# Patient Record
Sex: FEMALE | Race: OTHER | NOT HISPANIC OR LATINO | URBAN - METROPOLITAN AREA
[De-identification: names, ages, dates, MRNs, and addresses within clinical notes are randomized per-mention and may not be internally consistent; named-entity substitution may affect disease eponyms.]

---

## 2022-10-19 NOTE — ASU PATIENT PROFILE, ADULT - PRO MENTAL HEALTH SX RECENT
Patient awake and resting in bed. Denies c/o pain, numbness or tingling at this time. Patient requesting food. Meal tray ordered per MD.  Patient expresses no further needs at this time. Call bell within reach. Awaiting MRI results. none

## 2022-10-19 NOTE — ASU PATIENT PROFILE, ADULT - FALL HARM RISK - UNIVERSAL INTERVENTIONS
Bed in lowest position, wheels locked, appropriate side rails in place/Call bell, personal items and telephone in reach/Instruct patient to call for assistance before getting out of bed or chair/Non-slip footwear when patient is out of bed/[place] to call system/Physically safe environment - no spills, clutter or unnecessary equipment/Purposeful Proactive Rounding/Room/bathroom lighting operational, light cord in reach

## 2022-10-20 ENCOUNTER — INPATIENT (INPATIENT)
Facility: HOSPITAL | Age: 37
LOS: 0 days | Discharge: HOME | DRG: 743 | End: 2022-10-21
Attending: OBSTETRICS & GYNECOLOGY | Admitting: OBSTETRICS & GYNECOLOGY
Payer: COMMERCIAL

## 2022-10-20 VITALS
HEIGHT: 63 IN | WEIGHT: 133.6 LBS | DIASTOLIC BLOOD PRESSURE: 79 MMHG | RESPIRATION RATE: 16 BRPM | SYSTOLIC BLOOD PRESSURE: 136 MMHG | TEMPERATURE: 98 F | HEART RATE: 78 BPM | OXYGEN SATURATION: 100 %

## 2022-10-20 DIAGNOSIS — Z98.890 OTHER SPECIFIED POSTPROCEDURAL STATES: Chronic | ICD-10-CM

## 2022-10-20 LAB
BLD GP AB SCN SERPL QL: NEGATIVE — SIGNIFICANT CHANGE UP
RH IG SCN BLD-IMP: POSITIVE — SIGNIFICANT CHANGE UP

## 2022-10-20 PROCEDURE — 88305 TISSUE EXAM BY PATHOLOGIST: CPT | Mod: 26

## 2022-10-20 RX ORDER — GABAPENTIN 400 MG/1
300 CAPSULE ORAL ONCE
Refills: 0 | Status: COMPLETED | OUTPATIENT
Start: 2022-10-20 | End: 2022-10-20

## 2022-10-20 RX ORDER — KETOROLAC TROMETHAMINE 30 MG/ML
30 SYRINGE (ML) INJECTION EVERY 8 HOURS
Refills: 0 | Status: DISCONTINUED | OUTPATIENT
Start: 2022-10-20 | End: 2022-10-21

## 2022-10-20 RX ORDER — HYDROMORPHONE HYDROCHLORIDE 2 MG/ML
0.5 INJECTION INTRAMUSCULAR; INTRAVENOUS; SUBCUTANEOUS
Refills: 0 | Status: DISCONTINUED | OUTPATIENT
Start: 2022-10-20 | End: 2022-10-21

## 2022-10-20 RX ORDER — ACETAMINOPHEN 500 MG
1000 TABLET ORAL ONCE
Refills: 0 | Status: COMPLETED | OUTPATIENT
Start: 2022-10-20 | End: 2022-10-20

## 2022-10-20 RX ORDER — ONDANSETRON 8 MG/1
8 TABLET, FILM COATED ORAL EVERY 8 HOURS
Refills: 0 | Status: DISCONTINUED | OUTPATIENT
Start: 2022-10-20 | End: 2022-10-21

## 2022-10-20 RX ORDER — SENNA PLUS 8.6 MG/1
2 TABLET ORAL AT BEDTIME
Refills: 0 | Status: DISCONTINUED | OUTPATIENT
Start: 2022-10-20 | End: 2022-10-21

## 2022-10-20 RX ORDER — SODIUM CHLORIDE 9 MG/ML
1000 INJECTION, SOLUTION INTRAVENOUS
Refills: 0 | Status: DISCONTINUED | OUTPATIENT
Start: 2022-10-20 | End: 2022-10-21

## 2022-10-20 RX ORDER — SIMETHICONE 80 MG/1
80 TABLET, CHEWABLE ORAL EVERY 6 HOURS
Refills: 0 | Status: DISCONTINUED | OUTPATIENT
Start: 2022-10-20 | End: 2022-10-21

## 2022-10-20 RX ORDER — CABERGOLINE 0.5 MG/1
0 TABLET ORAL
Qty: 0 | Refills: 0 | DISCHARGE

## 2022-10-20 RX ORDER — MULTIVIT-MIN/FERROUS GLUCONATE 9 MG/15 ML
1 LIQUID (ML) ORAL
Qty: 0 | Refills: 0 | DISCHARGE

## 2022-10-20 RX ORDER — HYDROMORPHONE HYDROCHLORIDE 2 MG/ML
0.5 INJECTION INTRAMUSCULAR; INTRAVENOUS; SUBCUTANEOUS EVERY 6 HOURS
Refills: 0 | Status: DISCONTINUED | OUTPATIENT
Start: 2022-10-20 | End: 2022-10-21

## 2022-10-20 RX ORDER — LEVOTHYROXINE SODIUM 125 MCG
1 TABLET ORAL
Qty: 0 | Refills: 0 | DISCHARGE

## 2022-10-20 RX ORDER — ACETAMINOPHEN 500 MG
1000 TABLET ORAL EVERY 6 HOURS
Refills: 0 | Status: DISCONTINUED | OUTPATIENT
Start: 2022-10-20 | End: 2022-10-21

## 2022-10-20 RX ORDER — LEVOTHYROXINE SODIUM 125 MCG
88 TABLET ORAL DAILY
Refills: 0 | Status: DISCONTINUED | OUTPATIENT
Start: 2022-10-21 | End: 2022-10-21

## 2022-10-20 RX ADMIN — HYDROMORPHONE HYDROCHLORIDE 0.5 MILLIGRAM(S): 2 INJECTION INTRAMUSCULAR; INTRAVENOUS; SUBCUTANEOUS at 20:31

## 2022-10-20 RX ADMIN — GABAPENTIN 300 MILLIGRAM(S): 400 CAPSULE ORAL at 13:10

## 2022-10-20 RX ADMIN — SODIUM CHLORIDE 125 MILLILITER(S): 9 INJECTION, SOLUTION INTRAVENOUS at 20:15

## 2022-10-20 RX ADMIN — Medication 1000 MILLIGRAM(S): at 13:10

## 2022-10-20 RX ADMIN — HYDROMORPHONE HYDROCHLORIDE 0.5 MILLIGRAM(S): 2 INJECTION INTRAMUSCULAR; INTRAVENOUS; SUBCUTANEOUS at 20:13

## 2022-10-20 NOTE — DISCHARGE NOTE PROVIDER - HOSPITAL COURSE
36 yo  LMP 10/15 s/p l/s endo excision, myomectomy (removal of 2 fibroids), b/l ureterolysis, hysteroscopy, D&C, b/l ovarian suspensions; she was admitted for post-operative monitoring and pain control. Bilateral ovarian suspensions were removed on POD1. She met all post-operative milestones and was discharged clinically and hemodynamically stable.

## 2022-10-20 NOTE — H&P ADULT - ASSESSMENT
PAST SURGICAL HISTORY:  No significant past surgical history     No significant past surgical history      38 yo  LMP 10/15 presenting for scheduled laparoscopic excision of endometriosis.    Neuro: tylenol/toradol, dilaudid 0.5 q6 PRN  CV: VSS  Pulm: RA  FEN/GI: , regular, senna, simethicone, zofran, -/-  GYN: b/l ovarian suspensions  : Vasquez in place  Heme: AM CBC  DVT ppx: SCDs

## 2022-10-20 NOTE — DISCHARGE NOTE PROVIDER - NSDCMRMEDTOKEN_GEN_ALL_CORE_FT
cabergoline 0.5 mg oral tablet: orally once a week  Synthroid 88 mcg (0.088 mg) oral tablet: 1 tab(s) orally once a day  Vitafol oral tablet: 1 tab(s) orally once a day

## 2022-10-20 NOTE — H&P ADULT - HISTORY OF PRESENT ILLNESS
38 yo G LMP presenting for scheduled laparoscopic excision of endometriosis.    OBHx:  GynHx:    PMH: hypothyroidism  PSH: hysteroscopy D&C  Meds: synthroid 88 mcg qd, cabergoline 0.5 mg once per week  Allergies: iodine- SOB 38 yo  LMP 10/15 presenting for scheduled laparoscopic excision of endometriosis.    Patient is undergoing fertility treatments and had markers concerning for endometriosis but has not yet been officially diagnosed.    OBHx: G1- SAB 2017, G2- MAB D&C   GynHx: small fibroid, HPV+ pap 2016 - self resolved, hx simple ovarian cysts    PMH: hypothyroidism, pituitary adenoma  PSH: hysteroscopy D&C   Meds: synthroid 88 mcg qd, cabergoline 0.5 mg once per week, multivitamin  Allergies: iodine- SOB

## 2022-10-20 NOTE — PRE-ANESTHESIA EVALUATION ADULT - NSPROPOSEDPROCEDFT_GEN_ALL_CORE
Laprascopic excision of endometriosis, hysteroscopy, D&C, possible adenomyomectomy, possible appendectomy

## 2022-10-20 NOTE — DISCHARGE NOTE PROVIDER - CARE PROVIDER_API CALL
Niharika Lutz)  Obstetrics and Gynecology  [address] [place] [id number]  Phone: [phone]  Fax: [phone]  Follow Up Time:

## 2022-10-20 NOTE — H&P ADULT - NSHPPHYSICALEXAM_GEN_ALL_CORE
Vital Signs Last 24 Hrs  T(C): 36 (20 Oct 2022 16:18), Max: 36.4 (20 Oct 2022 11:15)  T(F): 97.5 (20 Oct 2022 11:15), Max: 97.5 (20 Oct 2022 11:15)  HR: 78 (20 Oct 2022 16:18) (78 - 78)  BP: 136/79 (20 Oct 2022 16:18) (136/79 - 136/79)  BP(mean): --  RR: 16 (20 Oct 2022 16:18) (16 - 16)  SpO2: 100% (20 Oct 2022 16:18) (100% - 100%)    Gen: well appearing  Pulm: normal respiratory rate  Abd: soft, nontender, nondistended  Ext; no edema or erythema
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
## 2022-10-20 NOTE — DISCHARGE NOTE PROVIDER - NSDCCPTREATMENT_GEN_ALL_CORE_FT
PRINCIPAL PROCEDURE  Procedure: Laparoscopic myomectomy  Findings and Treatment:       SECONDARY PROCEDURE  Procedure: Hysteroscopy with dilation and curettage of uterus  Findings and Treatment:     Procedure: Excision, endometriosis, laparoscopic  Findings and Treatment:

## 2022-10-20 NOTE — BRIEF OPERATIVE NOTE - NSICDXBRIEFPROCEDURE_GEN_ALL_CORE_FT
PROCEDURES:  Excision, endometriosis, laparoscopic 20-Oct-2022 19:45:13  Renetta Dodd  Laparoscopic myomectomy 20-Oct-2022 19:45:33  Renetta Dodd  Hysteroscopy with dilation and curettage of uterus 20-Oct-2022 19:45:48  Renetta Dodd

## 2022-10-21 VITALS
RESPIRATION RATE: 16 BRPM | HEART RATE: 73 BPM | SYSTOLIC BLOOD PRESSURE: 114 MMHG | OXYGEN SATURATION: 100 % | DIASTOLIC BLOOD PRESSURE: 66 MMHG | TEMPERATURE: 99 F

## 2022-10-21 LAB
BASOPHILS # BLD AUTO: 0.03 K/UL — SIGNIFICANT CHANGE UP (ref 0–0.2)
BASOPHILS NFR BLD AUTO: 0.2 % — SIGNIFICANT CHANGE UP (ref 0–2)
EOSINOPHIL # BLD AUTO: 0 K/UL — SIGNIFICANT CHANGE UP (ref 0–0.5)
EOSINOPHIL NFR BLD AUTO: 0 % — SIGNIFICANT CHANGE UP (ref 0–6)
HCT VFR BLD CALC: 36.3 % — SIGNIFICANT CHANGE UP (ref 34.5–45)
HGB BLD-MCNC: 11.6 G/DL — SIGNIFICANT CHANGE UP (ref 11.5–15.5)
IMM GRANULOCYTES NFR BLD AUTO: 0.3 % — SIGNIFICANT CHANGE UP (ref 0–0.9)
LYMPHOCYTES # BLD AUTO: 1.47 K/UL — SIGNIFICANT CHANGE UP (ref 1–3.3)
LYMPHOCYTES # BLD AUTO: 12 % — LOW (ref 13–44)
MCHC RBC-ENTMCNC: 26.4 PG — LOW (ref 27–34)
MCHC RBC-ENTMCNC: 32 GM/DL — SIGNIFICANT CHANGE UP (ref 32–36)
MCV RBC AUTO: 82.7 FL — SIGNIFICANT CHANGE UP (ref 80–100)
MONOCYTES # BLD AUTO: 0.72 K/UL — SIGNIFICANT CHANGE UP (ref 0–0.9)
MONOCYTES NFR BLD AUTO: 5.9 % — SIGNIFICANT CHANGE UP (ref 2–14)
NEUTROPHILS # BLD AUTO: 9.98 K/UL — HIGH (ref 1.8–7.4)
NEUTROPHILS NFR BLD AUTO: 81.6 % — HIGH (ref 43–77)
NRBC # BLD: 0 /100 WBCS — SIGNIFICANT CHANGE UP (ref 0–0)
PLATELET # BLD AUTO: 284 K/UL — SIGNIFICANT CHANGE UP (ref 150–400)
RBC # BLD: 4.39 M/UL — SIGNIFICANT CHANGE UP (ref 3.8–5.2)
RBC # FLD: 15.5 % — HIGH (ref 10.3–14.5)
WBC # BLD: 12.24 K/UL — HIGH (ref 3.8–10.5)
WBC # FLD AUTO: 12.24 K/UL — HIGH (ref 3.8–10.5)

## 2022-10-21 PROCEDURE — 86901 BLOOD TYPING SEROLOGIC RH(D): CPT

## 2022-10-21 PROCEDURE — 85025 COMPLETE CBC W/AUTO DIFF WBC: CPT

## 2022-10-21 PROCEDURE — 88305 TISSUE EXAM BY PATHOLOGIST: CPT

## 2022-10-21 PROCEDURE — 36415 COLL VENOUS BLD VENIPUNCTURE: CPT

## 2022-10-21 PROCEDURE — 86900 BLOOD TYPING SEROLOGIC ABO: CPT

## 2022-10-21 PROCEDURE — 86850 RBC ANTIBODY SCREEN: CPT

## 2022-10-21 RX ORDER — KETOROLAC TROMETHAMINE 30 MG/ML
30 SYRINGE (ML) INJECTION EVERY 6 HOURS
Refills: 0 | Status: DISCONTINUED | OUTPATIENT
Start: 2022-10-21 | End: 2022-10-21

## 2022-10-21 RX ORDER — INFLUENZA VIRUS VACCINE 15; 15; 15; 15 UG/.5ML; UG/.5ML; UG/.5ML; UG/.5ML
0.5 SUSPENSION INTRAMUSCULAR ONCE
Refills: 0 | Status: DISCONTINUED | OUTPATIENT
Start: 2022-10-21 | End: 2022-10-21

## 2022-10-21 RX ADMIN — SENNA PLUS 2 TABLET(S): 8.6 TABLET ORAL at 00:41

## 2022-10-21 RX ADMIN — Medication 30 MILLIGRAM(S): at 01:00

## 2022-10-21 RX ADMIN — Medication 30 MILLIGRAM(S): at 09:19

## 2022-10-21 RX ADMIN — Medication 1000 MILLIGRAM(S): at 06:45

## 2022-10-21 RX ADMIN — Medication 1000 MILLIGRAM(S): at 00:41

## 2022-10-21 RX ADMIN — Medication 30 MILLIGRAM(S): at 00:42

## 2022-10-21 RX ADMIN — Medication 1000 MILLIGRAM(S): at 05:42

## 2022-10-21 RX ADMIN — Medication 1000 MILLIGRAM(S): at 11:33

## 2022-10-21 RX ADMIN — SIMETHICONE 80 MILLIGRAM(S): 80 TABLET, CHEWABLE ORAL at 05:46

## 2022-10-21 RX ADMIN — Medication 1000 MILLIGRAM(S): at 00:55

## 2022-10-21 RX ADMIN — Medication 30 MILLIGRAM(S): at 09:35

## 2022-10-21 RX ADMIN — SIMETHICONE 80 MILLIGRAM(S): 80 TABLET, CHEWABLE ORAL at 11:32

## 2022-10-21 NOTE — DISCHARGE NOTE NURSING/CASE MANAGEMENT/SOCIAL WORK - NSDCPEFALRISK_GEN_ALL_CORE
For information on Fall & Injury Prevention, visit: [email or web address] OR  [email or web address] OR  https://www.cdc.gov/steadi/patient.html

## 2022-10-21 NOTE — PROGRESS NOTE ADULT - SUBJECTIVE AND OBJECTIVE BOX
Patient evaluated at bedside. She has no complaints. She reports pain is well controlled with medications. She denies HA, dizziness, SOB, CP, palpitations, n/v.  She is ambulating tolerating regular diet. Vasquez in place. No flatus yet.    T(C): 36.2 (10-21-22 @ 05:18), Max: 37.1 (10-20-22 @ 19:41)  HR: 65 (10-21-22 @ 05:18) (54 - 78)  BP: 118/75 (10-21-22 @ 05:18) (107/72 - 124/65)  RR: 17 (10-21-22 @ 05:18) (10 - 17)  SpO2: 100% (10-21-22 @ 05:18) (98% - 100%)    GA:  Resp: normal respiratory effort  Abd: +BS, soft, NT/ND, no rebound or guarding, b/l suspensions intact  Extrem: SCDs in place, no LE edema       10-20 @ 07:01  -  10-21 @ 07:00  --------------------------------------------------------  IN: 1500 mL / OUT: 420 mL / NET: 1080 mL                 Patient evaluated at bedside. She has no complaints. She reports pain is well controlled with medications.  She endorses nausea, but no vomiting.  She denies HA, dizziness, SOB, CP, palpitations.  She is ambulating tolerating regular diet. Vasquez in place. No flatus yet.    T(C): 36.2 (10-21-22 @ 05:18), Max: 37.1 (10-20-22 @ 19:41)  HR: 65 (10-21-22 @ 05:18) (54 - 78)  BP: 118/75 (10-21-22 @ 05:18) (107/72 - 124/65)  RR: 17 (10-21-22 @ 05:18) (10 - 17)  SpO2: 100% (10-21-22 @ 05:18) (98% - 100%)    GA:  Resp: normal respiratory effort  Abd: +BS, soft, NT/ND, no rebound or guarding, b/l suspensions intact  Extrem: SCDs in place, no LE edema       10-20 @ 07:01  -  10-21 @ 07:00  --------------------------------------------------------  IN: 1500 mL / OUT: 420 mL / NET: 1080 mL

## 2022-10-21 NOTE — PROGRESS NOTE ADULT - ASSESSMENT
36 yo  LMP 10/15 s/p l/s endo excision, myomectomy (removal of 2 fibroids), b/l ureterolysis, hysteroscopy, D&C, b/l ovarian suspensions    Neuro: tylenol/toradol, dilaudid 0.5 q6 PRN  CV: VSS  Pulm: RA  FEN/GI: , regular, senna, simethicone, zofran, -/-  GYN: s/p b/l ovarian suspensions  : s/p Christina, TOV 15:00  Heme: AM CBC pending  Endo: synthroid 88 mcg qd  DVT ppx: SCDs     36 yo  LMP 10/15 s/p l/s endo excision, myomectomy (removal of 2 fibroids), b/l ureterolysis, hysteroscopy, D&C, b/l ovarian suspensions, admitted for postoperative nausea.    Neuro: tylenol/toradol, dilaudid 0.5 q6 PRN  CV: VSS  Pulm: RA  FEN/GI: , regular, senna, simethicone, zofran, -/-  GYN: s/p b/l ovarian suspensions  : s/p Christina, TOV 15:00  Heme: AM CBC pending  Endo: synthroid 88 mcg qd  DVT ppx: SCDs

## 2022-10-21 NOTE — PATIENT PROFILE ADULT - FALL HARM RISK - HARM RISK INTERVENTIONS

## 2022-10-21 NOTE — CHART NOTE - NSCHARTNOTEFT_GEN_A_CORE
Note entry delayed 2/2 pt care.     GYN POC    Pt seen at bedside for POC. Pt found to be sleeping.    T(F): 97.4 (10-21-22 @ 01:09), Max: 98.8 (10-20-22 @ 19:41)  HR: 64 (10-21-22 @ 01:09) (54 - 78)  BP: 107/72 (10-21-22 @ 01:09) (107/72 - 136/79)  RR: 17 (10-21-22 @ 01:09) (10 - 17)  SpO2: 98% (10-21-22 @ 01:09) (98% - 100%)  Wt(kg): --    10-20 @ 07:01  -  10-21 @ 03:53  --------------------------------------------------------  IN: 1250 mL / OUT: 420 mL / NET: 830 mL        acetaminophen     Tablet .. 1000 milliGRAM(s) Oral every 6 hours  HYDROmorphone  Injectable 0.5 milliGRAM(s) IV Push every 15 minutes PRN Severe Pain (7 - 10)  HYDROmorphone  Injectable 0.5 milliGRAM(s) IV Push every 6 hours PRN Severe Pain (7 - 10)  ketorolac   Injectable 30 milliGRAM(s) IV Push every 6 hours  lactated ringers. 1000 milliLiter(s) IV Continuous <Continuous>  levothyroxine 88 MICROGram(s) Oral daily  ondansetron Injectable 8 milliGRAM(s) IV Push every 8 hours PRN Nausea and/or Vomiting  senna 2 Tablet(s) Oral at bedtime  simethicone 80 milliGRAM(s) Chew every 6 hours PRN Gas      Physical exam:  Constitutional: NAD, sleeping  Pulmonary: no inc WOB  : austin draining yellow urine    A: 36 yo  LMP 10/15 s/p l/s endo excision, myomectomy (removal of 2 fibroids), b/l ureterolysis, hysteroscopy, D&C, b/l ovarian suspensions  Neuro: tylenol/toradol, dilaudid 0.5 q6 PRN  CV: VSS  Pulm: RA  FEN/GI: , regular, senna, simethicone, zofran, -/-  GYN: b/l ovarian suspensions  : Austin in place  Heme: AM CBC  Endo: synthroid 88 mcg qd  DVT ppx: SCDs      - F/u AM CBC  - Austin catheter and ovarian suspensions out in AM

## 2022-10-21 NOTE — DISCHARGE NOTE NURSING/CASE MANAGEMENT/SOCIAL WORK - PATIENT PORTAL LINK FT
You can access the FollowMyHealth Patient Portal offered by [facility] by registering at the following website: [email or web address]. By joining [facility]’s FollowMyHealth portal, you will also be able to view your health information using other applications (apps) compatible with our system.

## 2022-10-27 DIAGNOSIS — N97.9 FEMALE INFERTILITY, UNSPECIFIED: ICD-10-CM

## 2022-10-27 DIAGNOSIS — N80.9 ENDOMETRIOSIS, UNSPECIFIED: ICD-10-CM

## 2022-10-27 DIAGNOSIS — E03.9 HYPOTHYROIDISM, UNSPECIFIED: ICD-10-CM

## 2022-10-27 DIAGNOSIS — Z91.013 ALLERGY TO SEAFOOD: ICD-10-CM

## 2022-10-27 DIAGNOSIS — Z79.890 HORMONE REPLACEMENT THERAPY: ICD-10-CM

## 2022-10-27 DIAGNOSIS — N80.00 ENDOMETRIOSIS OF THE UTERUS, UNSPECIFIED: ICD-10-CM

## 2022-10-27 DIAGNOSIS — D25.1 INTRAMURAL LEIOMYOMA OF UTERUS: ICD-10-CM

## 2022-10-27 DIAGNOSIS — Z91.041 RADIOGRAPHIC DYE ALLERGY STATUS: ICD-10-CM

## 2022-10-27 DIAGNOSIS — N85.2 HYPERTROPHY OF UTERUS: ICD-10-CM

## 2022-10-27 DIAGNOSIS — N83.8 OTHER NONINFLAMMATORY DISORDERS OF OVARY, FALLOPIAN TUBE AND BROAD LIGAMENT: ICD-10-CM

## 2022-11-01 LAB — SURGICAL PATHOLOGY STUDY: SIGNIFICANT CHANGE UP

## (undated) DEVICE — DRAPE 3/4 SHEET 52X76"

## (undated) DEVICE — PACK GYN WDC

## (undated) DEVICE — POSITIONER PINK PAD PIGAZZI SYSTEM XL W ARM PROTECTOR

## (undated) DEVICE — DRAPE TOWEL BLUE 17" X 24"

## (undated) DEVICE — TUBING STRYKER ARTHROSCOPY INFLOW

## (undated) DEVICE — GLV 8 PROTEXIS (WHITE)

## (undated) DEVICE — WARMING BLANKET UPPER ADULT

## (undated) DEVICE — CATH ANGIOCATH 12G

## (undated) DEVICE — Device

## (undated) DEVICE — VENODYNE/SCD SLEEVE CALF MEDIUM

## (undated) DEVICE — SUT PDS II 0 27" CT-1

## (undated) DEVICE — ENDOPATH 5MM CVD SCISSOR W MONOPOLAR CAUTERY DISP

## (undated) DEVICE — ELCTR THUNDERBEAT HANDPIECE 5MM X 35CM FRONT CONTROL

## (undated) DEVICE — ENDOCATCH II 15MM

## (undated) DEVICE — BLADE SURGICAL #12 CARBON STEEL

## (undated) DEVICE — SOL IRR BAG NS 0.9% 3000ML

## (undated) DEVICE — PACK DEL VAG LNX SURGICOUNT

## (undated) DEVICE — TUBING RANGER FLUID IRRIGATION SET DISP

## (undated) DEVICE — DRSG STERISTRIPS 0.5 X 4"

## (undated) DEVICE — DRAPE LIGHT HANDLE COVER (BLUE)

## (undated) DEVICE — SUT SILK 0 18" TIES

## (undated) DEVICE — TUBING AQUILEX OUTFLOW

## (undated) DEVICE — PRESSURE INFUSOR BAG 3000ML

## (undated) DEVICE — DRSG GAUZE VASELINE PETROLEUM 3 X 9"

## (undated) DEVICE — TUBING STRYKER PNEUMOCLEAR SMOKE EVACUATION HIGH FLOW

## (undated) DEVICE — ENDOCATCH 10MM SPECIMEN POUCH

## (undated) DEVICE — SUT VICRYL 0 27" UR-6

## (undated) DEVICE — FOLEY TRAY 14FR 5CC LF UMETER CLOSED

## (undated) DEVICE — TUBING TUR 2 PRONG

## (undated) DEVICE — SUT MAXON 0 30" HGU-46

## (undated) DEVICE — FOLEY TRAY 16FR 5CC LF UMETER CLOSED

## (undated) DEVICE — SUT MONOCRYL 2-0 27" SH VIOLET

## (undated) DEVICE — PACK GENERAL CLOSING

## (undated) DEVICE — GLV 6.5 PROTEXIS (WHITE)

## (undated) DEVICE — TROCAR COOPER SURGICAL LAPAROSCOPIC CANNULA 5MM

## (undated) DEVICE — DRAPE SURGICAL #1010

## (undated) DEVICE — BLADE SURGICAL #10 CARBON

## (undated) DEVICE — TROCAR COOPER SURGICAL PYRAMIDAL TIP 5X10.16

## (undated) DEVICE — D HELP - CLEARVIEW CLEARIFY SYSTEM

## (undated) DEVICE — NDL GRASPING DISP

## (undated) DEVICE — SUT ETHIBOND 0 30" CT-2

## (undated) DEVICE — PREP CHLORAPREP HI-LITE ORANGE 26ML

## (undated) DEVICE — INSUFFLATION NDL COVIDIEN SURGINEEDLE VERESS 120MM

## (undated) DEVICE — SUT MONOCRYL 4-0 27" PS-2 UNDYED

## (undated) DEVICE — ELCTR CUTTING LOOP FOR PRINCESS RESECTOSCOPE 21FR

## (undated) DEVICE — DRAPE STERI-DRAPE INCISE 32X33"

## (undated) DEVICE — SUT VICRYL PLUS 0 36" CT-1

## (undated) DEVICE — TROCAR ETHICON ENDOPATH MINI BLADELESS 2/3MM X 100MM SMOOTH

## (undated) DEVICE — SUT VICRYL 2-0 27" SH

## (undated) DEVICE — SUT VICRYL 1 18" CT-1 UNDYED (POP-OFF)

## (undated) DEVICE — ENDO LOOP LINA GOLD LOOP FOR LSH 200MM X 100MM LG CONNECTOR

## (undated) DEVICE — LIGASURE MARYLAND 37CM

## (undated) DEVICE — PACK D&C LNX SURGICOUNT

## (undated) DEVICE — SHEARS HARMONIC ACE 5MM X 36CM CURVED TIP

## (undated) DEVICE — RAYTEC SPONGE 4X4 16PLY

## (undated) DEVICE — POSITIONER FOAM EGG CRATE ULNAR 2PCS (PINK)

## (undated) DEVICE — DRAINAGE BAG URINARY 4000CC

## (undated) DEVICE — TROCAR ETHICON ENDOPATH XCEL BLADELESS 11MM X 100MM STABILITY